# Patient Record
Sex: FEMALE | Race: BLACK OR AFRICAN AMERICAN
[De-identification: names, ages, dates, MRNs, and addresses within clinical notes are randomized per-mention and may not be internally consistent; named-entity substitution may affect disease eponyms.]

---

## 2019-12-16 ENCOUNTER — HOSPITAL ENCOUNTER (OUTPATIENT)
Dept: HOSPITAL 74 - FASU-ENDO | Age: 54
Discharge: HOME | End: 2019-12-16
Attending: INTERNAL MEDICINE
Payer: COMMERCIAL

## 2019-12-16 VITALS — DIASTOLIC BLOOD PRESSURE: 60 MMHG | SYSTOLIC BLOOD PRESSURE: 110 MMHG | HEART RATE: 62 BPM

## 2019-12-16 VITALS — TEMPERATURE: 97.7 F

## 2019-12-16 VITALS — BODY MASS INDEX: 25 KG/M2

## 2019-12-16 DIAGNOSIS — Z12.11: Primary | ICD-10-CM

## 2019-12-16 DIAGNOSIS — K29.50: ICD-10-CM

## 2019-12-16 DIAGNOSIS — D12.0: ICD-10-CM

## 2019-12-16 PROCEDURE — 0DB98ZX EXCISION OF DUODENUM, VIA NATURAL OR ARTIFICIAL OPENING ENDOSCOPIC, DIAGNOSTIC: ICD-10-PCS | Performed by: INTERNAL MEDICINE

## 2019-12-16 PROCEDURE — 0DB68ZX EXCISION OF STOMACH, VIA NATURAL OR ARTIFICIAL OPENING ENDOSCOPIC, DIAGNOSTIC: ICD-10-PCS | Performed by: INTERNAL MEDICINE

## 2019-12-18 NOTE — PATH
Surgical Pathology Report



Patient Name:  ANAMIKA PATTERSON

Accession #:  [id number]

[facility]. Rec. #:  [id number]                                                        

   /Age/Gender:  1965 (Age: 54) / F

Account:  [id number]                                                          

             Location: [facility]

Taken:  2019

Received:  2019

Reported:  2019

Physicians:  Micky Maddox M.D.

  



Specimen(s) Received

A: SECOND PORTION DUODENUM 

B: ANTRUM 

C: CECUM POLYP 





Clinical History

GERD, screening

Postoperative diagnosis: Gastritis







Final Diagnosis

A. DUODENUM, SECOND PORTION, BIOPSY:

DUODENAL MUCOSA WITHOUT SIGNIFICANT PATHOLOGIC FINDINGS.



B. GASTRIC ANTRUM, BIOPSY:

GASTRIC ANTRAL MUCOSA WITH MILD CHRONIC GASTRITIS.

IMMUNOHISTOCHEMICAL STAIN FOR H. PYLORI IS NEGATIVE.



C. CECUM, POLYP, BIOPSY:

TUBULAR ADENOMA.







***Electronically Signed***

Cheryl Duron M.D.





Gross Description

A.  Received in formalin, labeled "biopsy second portion of duodenum" are 2 tan,

irregular portions of soft tissue averaging 0.4 cm. in greatest dimension. The

specimens are submitted in toto in one cassette.



B.  Received in formalin, labeled "biopsy gastric antrum" are 2 tan, irregular

portions of soft tissue averaging 0.3 cm. in greatest dimension. The specimens

are submitted in toto in one cassette.



C.  Received in formalin, labeled "biopsy polyp cecum" is a tan, polypoid

portion of soft tissue measuring 0.6 cm. in greatest dimension. The specimen is

submitted in toto in one cassette.





saudi